# Patient Record
Sex: MALE | Race: WHITE | NOT HISPANIC OR LATINO | URBAN - METROPOLITAN AREA
[De-identification: names, ages, dates, MRNs, and addresses within clinical notes are randomized per-mention and may not be internally consistent; named-entity substitution may affect disease eponyms.]

---

## 2017-11-03 ENCOUNTER — IMPORTED ENCOUNTER (OUTPATIENT)
Dept: URBAN - METROPOLITAN AREA CLINIC 1 | Facility: CLINIC | Age: 47
End: 2017-11-03

## 2017-11-03 PROBLEM — H04.123: Noted: 2017-11-03

## 2017-11-03 PROBLEM — H43.813: Noted: 2017-11-03

## 2017-11-03 PROBLEM — H25.813: Noted: 2017-11-03

## 2017-11-03 PROCEDURE — 92014 COMPRE OPH EXAM EST PT 1/>: CPT

## 2017-11-03 PROCEDURE — 92015 DETERMINE REFRACTIVE STATE: CPT

## 2017-11-03 NOTE — PATIENT DISCUSSION
1.  Dry Eyes OU - Use ATs BID OU Routinely. 2.  PVD OU - RD precautions. 3.  Cataract OU: Observe for now without intervention. The patient was advised to contact us if any change or worsening of vision4. Recurrent/Chronic Iritis OD h/o systemic Crohn's Disease: Quiet off Durezol. 5. H/o [address] OU Letter to PCP Return for an appointment in 1 yr 27 with Dr. Cristina Russell.

## 2017-11-08 ENCOUNTER — IMPORTED ENCOUNTER (OUTPATIENT)
Dept: URBAN - METROPOLITAN AREA CLINIC 1 | Facility: CLINIC | Age: 47
End: 2017-11-08

## 2017-11-08 PROBLEM — S05.01XD: Noted: 2017-11-08

## 2017-11-08 PROBLEM — H10.89: Noted: 2017-11-14

## 2017-11-08 PROCEDURE — 92012 INTRM OPH EXAM EST PATIENT: CPT

## 2017-11-08 NOTE — PATIENT DISCUSSION
1.  Traumatic conjunctivitis OD : no abrasion seen on exam today. Decrease Tobrex to BID OD x 3 days and then discontinue. RTC in January and sooner PRN. Return for an appointment in January for 30 with Dr. Foster Paez.

## 2017-11-08 NOTE — PATIENT DISCUSSION
Spoke with patient on the phone and he said his dog hit him in OD with his paw and he thinks his eye is scratched. I told him to use Tobrex TID OD and come in for an appointment tomorrow 11/8/17.

## 2018-11-09 ENCOUNTER — IMPORTED ENCOUNTER (OUTPATIENT)
Dept: URBAN - METROPOLITAN AREA CLINIC 1 | Facility: CLINIC | Age: 48
End: 2018-11-09

## 2018-11-09 PROBLEM — H25.813: Noted: 2018-11-09

## 2018-11-09 PROBLEM — H04.123: Noted: 2018-11-09

## 2018-11-09 PROBLEM — H43.813: Noted: 2018-11-09

## 2018-11-09 PROBLEM — H16.143: Noted: 2018-11-09

## 2018-11-09 PROCEDURE — 92014 COMPRE OPH EXAM EST PT 1/>: CPT

## 2018-11-09 NOTE — PATIENT DISCUSSION
1.  Cataract OU: Observe for now without intervention. The patient was advised to contact us if any change or worsening of vision2. ROSALINA w/ PEK OU- Controlled. The continuation of artificial tears were recommended. 3.  PVD OU - RD precautions. 4.  S/p PRK OU- Previous high myope OU5. H/o Iritis secondary from Chrohn's Disease- Inactive. 6. Return for an appointment for 30 n 1 year with Dr. Cassandra Zhao.

## 2020-02-21 ENCOUNTER — IMPORTED ENCOUNTER (OUTPATIENT)
Dept: URBAN - METROPOLITAN AREA CLINIC 1 | Facility: CLINIC | Age: 50
End: 2020-02-21

## 2020-02-21 PROBLEM — H20.12: Noted: 2020-02-21

## 2020-02-21 PROCEDURE — 92012 INTRM OPH EXAM EST PATIENT: CPT

## 2020-02-21 NOTE — PATIENT DISCUSSION
1.  Chronic Iritis OS secondary from Crohn's -- Begin Pred forte TID OS. (erx'd)2. Hx Cataract OU3. ROSALINA w/ PEK OU -- Controlled. The continuation of artificial tears were recommended. 4. Hx PVD OU 5. S/p [address] OU -- Previous high myope OU. Return for an appointment in 1 week 10 with Dr. Eunice Salazar.

## 2020-03-04 ENCOUNTER — IMPORTED ENCOUNTER (OUTPATIENT)
Dept: URBAN - METROPOLITAN AREA CLINIC 1 | Facility: CLINIC | Age: 50
End: 2020-03-04

## 2020-03-04 PROBLEM — H25.813: Noted: 2020-03-04

## 2020-03-04 PROBLEM — H20.022: Noted: 2020-03-04

## 2020-03-04 PROBLEM — H43.813: Noted: 2020-03-04

## 2020-03-04 PROCEDURE — 92012 INTRM OPH EXAM EST PATIENT: CPT

## 2020-03-04 NOTE — PATIENT DISCUSSION
1.  Iritis OS (Crohn's) -- AC Quiet today OS on Pred TID OS. Will begin taper; decrease Pred to BID OS x 1 week then Qd OS x 1 week then d/c.2. PVD OU w/ Vitreous floaters OS -- Old stable. No retinal tears holes or detachments seen. Reassurance and RD precautions given. 3. Cataract OU -- Observe. 4. ROSALINA w/ PEK OU -- Controlled. The continuation of artificial tears were recommended. 5.  S/p PRK OU -- Previous high myope OU. Return for an appointment in 6 months for a 27 with Dr. Mame Reno.

## 2020-09-08 ENCOUNTER — IMPORTED ENCOUNTER (OUTPATIENT)
Dept: URBAN - METROPOLITAN AREA CLINIC 1 | Facility: CLINIC | Age: 50
End: 2020-09-08

## 2020-09-08 PROBLEM — H25.813: Noted: 2020-09-08

## 2020-09-08 PROBLEM — H04.123: Noted: 2020-09-08

## 2020-09-08 PROBLEM — H16.143: Noted: 2020-09-08

## 2020-09-08 PROCEDURE — 92015 DETERMINE REFRACTIVE STATE: CPT

## 2020-09-08 PROCEDURE — 92014 COMPRE OPH EXAM EST PT 1/>: CPT

## 2020-09-08 NOTE — PATIENT DISCUSSION
1.  Cataract OU: Observe for now without intervention. The patient was advised to contact us if any change or worsening of vision2. Dry Eyes OU - Recommend ATs BID OU routinely 3. PVD OU - RD precautions. 4.  S/p PRK OU- Previous high myope OU5. H/o Iritis secondary from Chrohn's Disease - Quiet  MRX for glasses given. Return for an appointment in 1 year 27 with Dr. Jensen Bliss.

## 2021-09-07 ENCOUNTER — IMPORTED ENCOUNTER (OUTPATIENT)
Dept: URBAN - METROPOLITAN AREA CLINIC 1 | Facility: CLINIC | Age: 51
End: 2021-09-07

## 2021-09-07 PROBLEM — H25.813: Noted: 2021-09-07

## 2021-09-07 PROBLEM — H43.813: Noted: 2021-09-07

## 2021-09-07 PROBLEM — H04.123: Noted: 2021-09-07

## 2021-09-07 PROCEDURE — 99214 OFFICE O/P EST MOD 30 MIN: CPT

## 2021-09-07 NOTE — PATIENT DISCUSSION
1.  Cataract OU -- Observe for now without intervention. The patient was advised to contact us if any change or worsening of vision2. Dry Eyes OU -- Recommend ATs BID OU routinely 3. PVD OU -- RD precautions. 4.  S/p PRK OU -- Previous high myope OU5. H/o Iritis secondary from Chrohn's Disease -- Quiet  Return for an appointment in 1 year for a 30/glare with Dr. Mariam Sinha.

## 2021-10-07 ENCOUNTER — IMPORTED ENCOUNTER (OUTPATIENT)
Dept: URBAN - METROPOLITAN AREA CLINIC 1 | Facility: CLINIC | Age: 51
End: 2021-10-07

## 2021-10-07 PROBLEM — H43.813: Noted: 2021-10-07

## 2021-10-07 PROBLEM — H20.12: Noted: 2021-10-07

## 2021-10-07 PROCEDURE — 99213 OFFICE O/P EST LOW 20 MIN: CPT

## 2021-10-07 NOTE — PATIENT DISCUSSION
1.  Chronic Iritis OS: secondary to Crohn's Disease. Begin Pred QID OS (erx). 2.  PVD OU - RD precautions. 3.  Cataract OU - Observe for now without intervention. The patient was advised to contact us if any change or worsening of vision4. Dry Eyes OU - Recommend ATs BID OU routinely 5. S/p [address] OU - Previous high myope OUReturn for an appointment in 2 weeks 10 with Dr. Elías Campbell.

## 2021-10-21 ENCOUNTER — IMPORTED ENCOUNTER (OUTPATIENT)
Dept: URBAN - METROPOLITAN AREA CLINIC 1 | Facility: CLINIC | Age: 51
End: 2021-10-21

## 2021-10-21 PROBLEM — H20.12: Noted: 2021-10-21

## 2021-10-21 PROCEDURE — 99213 OFFICE O/P EST LOW 20 MIN: CPT

## 2021-10-21 NOTE — PATIENT DISCUSSION
secondary to Crohn's Disease. Quiet. Decrease TID OS x 2 weeks then BID OS x 2 weeks then QD OS until seen  2. Cataract OU - Observe for now without intervention. The patient was advised to contact us if any change or worsening of vision3. Dry Eyes OU - Recommend ATs BID OU routinely 4. H/o PVD OU5. S/p [address] OU - Previous high myope OUReturn for an appointment in 1 month 10 with Dr. Shi De La Paz.

## 2021-12-03 ENCOUNTER — IMPORTED ENCOUNTER (OUTPATIENT)
Dept: URBAN - METROPOLITAN AREA CLINIC 1 | Facility: CLINIC | Age: 51
End: 2021-12-03

## 2021-12-03 PROBLEM — H20.12: Noted: 2021-12-03

## 2021-12-03 PROBLEM — H20.12: Status: RECURRENCE | Noted: 2021-12-03

## 2021-12-03 PROCEDURE — 99213 OFFICE O/P EST LOW 20 MIN: CPT

## 2021-12-03 NOTE — PATIENT DISCUSSION
1.  Chronic Iritis OS - secondary to Crohn's Disease. Quiet off gtts. 2.  Cataract OU - Observe for now without intervention. The patient was advised to contact us if any change or worsening of vision. 3. Dry Eyes OU - Recommend ATs BID OU routinely 4. H/o PVD OU5. S/p [address] OU - Previous high myope OUReturn for an appointment for Return as scheduled with Dr. Raegan Villalobos.

## 2021-12-03 NOTE — PATIENT DISCUSSION
secondary to Crohn's Disease. Quiet. Decrease TID OS x 2 weeks then BID OS x 2 weeks then QD OS until seen  2. Cataract OU - Observe for now without intervention. The patient was advised to contact us if any change or worsening of vision3. Dry Eyes OU - Recommend ATs BID OU routinely 4. H/o PVD OU5. S/p [address] OU - Previous high myope OUReturn for an appointment in 1 month 10 with Dr. Gildardo Willson.

## 2022-04-02 ASSESSMENT — VISUAL ACUITY
OD_CC: 20/30
OD_CC: 20/30
OS_CC: 20/20
OS_SC: 20/20-1
OS_CC: 20/25
OS_CC: 20/20
OD_CC: 20/20
OD_CC: 20/20
OS_CC: 20/25
OS_CC: 20/20-2
OS_SC: 20/20
OS_SC: J7
OD_SC: 20/20
OD_CC: J1+
OS_CC: 20/25
OD_CC: 20/20-1
OD_CC: 20/25
OD_CC: 20/30-1
OD_SC: J8
OS_SC: J8
OS_CC: 20/30
OD_CC: 20/25-1
OD_SC: 20/20
OS_CC: 20/25-2
OS_CC: J1+
OD_SC: J7

## 2022-04-02 ASSESSMENT — TONOMETRY
OD_IOP_MMHG: 11
OD_IOP_MMHG: 10
OS_IOP_MMHG: 10
OD_IOP_MMHG: 9
OD_IOP_MMHG: 10
OD_IOP_MMHG: 10
OS_IOP_MMHG: 9
OS_IOP_MMHG: 11
OD_IOP_MMHG: 10
OS_IOP_MMHG: 11
OS_IOP_MMHG: 9
OS_IOP_MMHG: 11
OS_IOP_MMHG: 10
OS_IOP_MMHG: 8
OD_IOP_MMHG: 11
OD_IOP_MMHG: 12
OS_IOP_MMHG: 10
OS_IOP_MMHG: 11

## 2022-05-27 ENCOUNTER — EMERGENCY VISIT (OUTPATIENT)
Dept: URBAN - METROPOLITAN AREA CLINIC 1 | Facility: CLINIC | Age: 52
End: 2022-05-27

## 2022-05-27 DIAGNOSIS — Z98.890: ICD-10-CM

## 2022-05-27 DIAGNOSIS — H04.123: ICD-10-CM

## 2022-05-27 DIAGNOSIS — H25.813: ICD-10-CM

## 2022-05-27 DIAGNOSIS — H20.12: ICD-10-CM

## 2022-05-27 PROCEDURE — 92012 INTRM OPH EXAM EST PATIENT: CPT

## 2022-05-27 ASSESSMENT — VISUAL ACUITY
OS_SC: 20/30
OD_SC: 20/25

## 2022-05-27 ASSESSMENT — TONOMETRY
OD_IOP_MMHG: 10
OS_IOP_MMHG: 10

## 2022-05-27 NOTE — PATIENT DISCUSSION
Secondary to Crohn's Disease. Ok to continue Pred Q2H w/a OS (pt self started when symptoms began). Return 3 weeks. Begin slow wean if quiet.

## 2022-06-16 ENCOUNTER — FOLLOW UP (OUTPATIENT)
Dept: URBAN - METROPOLITAN AREA CLINIC 1 | Facility: CLINIC | Age: 52
End: 2022-06-16

## 2022-06-16 DIAGNOSIS — H20.12: ICD-10-CM

## 2022-06-16 PROCEDURE — 99213 OFFICE O/P EST LOW 20 MIN: CPT

## 2022-06-16 ASSESSMENT — TONOMETRY
OS_IOP_MMHG: 09
OD_IOP_MMHG: 11

## 2022-06-16 ASSESSMENT — VISUAL ACUITY
OD_SC: 20/25
OS_SC: 20/15-1

## 2022-06-16 NOTE — PATIENT DISCUSSION
Quiet today. Secondary to Crohn's Disease. Decrease Pred to QID OS x 1 month, then decrease to TID OS until seen.

## 2022-08-22 ENCOUNTER — FOLLOW UP (OUTPATIENT)
Dept: URBAN - METROPOLITAN AREA CLINIC 1 | Facility: CLINIC | Age: 52
End: 2022-08-22

## 2022-08-22 DIAGNOSIS — H20.12: ICD-10-CM

## 2022-08-22 PROCEDURE — 92012 INTRM OPH EXAM EST PATIENT: CPT

## 2022-08-22 ASSESSMENT — TONOMETRY
OS_IOP_MMHG: 09
OD_IOP_MMHG: 09

## 2022-08-22 ASSESSMENT — VISUAL ACUITY
OS_SC: 20/25
OD_SC: 20/25

## 2022-11-11 ENCOUNTER — COMPREHENSIVE EXAM (OUTPATIENT)
Dept: URBAN - METROPOLITAN AREA CLINIC 1 | Facility: CLINIC | Age: 52
End: 2022-11-11

## 2022-11-11 DIAGNOSIS — Z98.890: ICD-10-CM

## 2022-11-11 DIAGNOSIS — H04.123: ICD-10-CM

## 2022-11-11 DIAGNOSIS — H43.813: ICD-10-CM

## 2022-11-11 DIAGNOSIS — H25.813: ICD-10-CM

## 2022-11-11 DIAGNOSIS — H20.12: ICD-10-CM

## 2022-11-11 DIAGNOSIS — H11.33: ICD-10-CM

## 2022-11-11 PROCEDURE — 99214 OFFICE O/P EST MOD 30 MIN: CPT

## 2022-11-11 ASSESSMENT — TONOMETRY
OD_IOP_MMHG: 9
OS_IOP_MMHG: 20

## 2022-11-11 ASSESSMENT — VISUAL ACUITY
OD_SC: 20/30-2
OS_SC: 20/25-2

## 2023-04-24 ENCOUNTER — FOLLOW UP (OUTPATIENT)
Dept: URBAN - METROPOLITAN AREA CLINIC 1 | Facility: CLINIC | Age: 53
End: 2023-04-24

## 2023-04-24 DIAGNOSIS — H20.12: ICD-10-CM

## 2023-04-24 PROCEDURE — 92012 INTRM OPH EXAM EST PATIENT: CPT

## 2023-04-24 ASSESSMENT — VISUAL ACUITY
OD_SC: 20/30
OD_CC: J1
OS_CC: J1
OS_SC: 20/25

## 2023-04-24 ASSESSMENT — TONOMETRY
OS_IOP_MMHG: 18
OD_IOP_MMHG: 15

## 2025-07-17 ENCOUNTER — COMPREHENSIVE EXAM (OUTPATIENT)
Age: 55
End: 2025-07-17

## 2025-07-17 DIAGNOSIS — H20.12: ICD-10-CM

## 2025-07-17 DIAGNOSIS — H43.813: ICD-10-CM

## 2025-07-17 DIAGNOSIS — H25.813: ICD-10-CM

## 2025-07-17 DIAGNOSIS — H04.123: ICD-10-CM

## 2025-07-17 PROCEDURE — 99214 OFFICE O/P EST MOD 30 MIN: CPT
